# Patient Record
Sex: FEMALE | Race: WHITE | URBAN - METROPOLITAN AREA
[De-identification: names, ages, dates, MRNs, and addresses within clinical notes are randomized per-mention and may not be internally consistent; named-entity substitution may affect disease eponyms.]

---

## 2017-01-17 DIAGNOSIS — I10 ESSENTIAL HYPERTENSION, BENIGN: Primary | ICD-10-CM

## 2017-01-17 NOTE — TELEPHONE ENCOUNTER
Losartan      Last Written Prescription Date: 02/16/16  Last Fill Quantity: 90, # refills: 3  [place] [facility] or [facility] prescribing provider: 02/16/16       POTASSIUM   Date Value Ref Range Status   02/16/2016 4.7 3.4 - 5.3 mmol/L Final     CREATININE   Date Value Ref Range Status   02/16/2016 0.73 0.52 - 1.04 mg/dL Final     BP Readings from Last 3 Encounters:   02/16/16 110/80   02/19/15 110/72   12/19/13 122/82

## 2017-01-21 RX ORDER — LOSARTAN POTASSIUM 100 MG/1
100 TABLET ORAL DAILY
Qty: 90 TABLET | Refills: 3 | OUTPATIENT
Start: 2017-01-21

## 2017-02-13 ENCOUNTER — TELEPHONE (OUTPATIENT)
Dept: INTERNAL MEDICINE | Facility: CLINIC | Age: 62
End: 2017-02-13

## 2017-02-13 DIAGNOSIS — E78.5 HYPERLIPIDEMIA LDL GOAL <130: ICD-10-CM

## 2017-02-13 NOTE — TELEPHONE ENCOUNTER
Pt calls back.   She states that she has not had a fever for about a week. Cough is only symptoms that continues to linger. She is able to sleep at night, but then coughing starts again when she wakes up. Cough is productive. Home care recommendations provided to pt - tea with lemon and honey, cough drops, humidifier in bedroom, fluids and rest. Recommended office visit if symptoms do not improve over the next 2 weeks or if develops any new symptoms.

## 2017-02-13 NOTE — TELEPHONE ENCOUNTER
Reason for Call:  Other     Detailed comments: pt states that she has been sick with vomitting,diarrhea,fever & cough for the past 2 weeks. She is feeling better but cannot get rid of her cough. She would like to know what she can do for it.    Phone Number Patient can be reached at: Home number on file [phone] (home)    Best Time: anytime    Can we leave a detailed message on this number? YES    Call taken on 2/13/2017 at 11:30 AM by Jacqui Guy

## 2017-02-14 RX ORDER — SIMVASTATIN 40 MG
40 TABLET ORAL AT BEDTIME
Qty: 30 TABLET | Refills: 0 | Status: SHIPPED | OUTPATIENT
Start: 2017-02-14 | End: 2017-03-15

## 2017-03-09 DIAGNOSIS — I10 ESSENTIAL HYPERTENSION, BENIGN: ICD-10-CM

## 2017-03-09 NOTE — TELEPHONE ENCOUNTER
Metoprolol      Last Written Prescription Date: 2/16/16  Last Fill Quantity: 180, # refills: 3    Last Office Visit with [facility], [facility] or [facility] prescribing provider:  2/16/16   Future Office Visit:        BP Readings from Last 3 Encounters:   02/16/16 110/80   02/19/15 110/72   12/19/13 122/82

## 2017-03-15 DIAGNOSIS — E78.5 HYPERLIPIDEMIA LDL GOAL <130: ICD-10-CM

## 2017-03-15 NOTE — TELEPHONE ENCOUNTER
Simvastatin  Last Written Prescription Date: 02/14/17  Last Fill Quantity: 30, # refills: 0  Last Office Visit with [facility], [facility] or [facility] prescribing provider: 02/16/16  Next 5 appointments (look out 90 days)     Mar 21, 2017  8:20 AM CDT   [facility]    [address]  [facility] [address]   [phone]                   Lab Results   Component Value Date    CHOL 192 02/16/2016     Lab Results   Component Value Date    HDL 73 02/16/2016     Lab Results   Component Value Date    LDL 97 02/16/2016     Lab Results   Component Value Date    TRIG 112 02/16/2016     Lab Results   Component Value Date    CHOLHDLRATIO 2.6 02/19/2015       Labs showing if normal/abnormal  Lab Results   Component Value Date    CHOL 192 02/16/2016    TRIG 112 02/16/2016    HDL 73 02/16/2016    LDL 97 02/16/2016    VLDL 25 02/19/2015    CHOLHDLRATIO 2.6 02/19/2015

## 2017-03-16 RX ORDER — SIMVASTATIN 40 MG
40 TABLET ORAL AT BEDTIME
Qty: 30 TABLET | Refills: 0 | Status: SHIPPED | OUTPATIENT
Start: 2017-03-16 | End: 2017-03-21

## 2017-03-16 RX ORDER — METOPROLOL SUCCINATE 50 MG/1
50 TABLET, EXTENDED RELEASE ORAL 2 TIMES DAILY
Qty: 180 TABLET | Refills: 0 | Status: SHIPPED | OUTPATIENT
Start: 2017-03-16 | End: 2017-03-21

## 2017-03-21 ENCOUNTER — OFFICE VISIT (OUTPATIENT)
Dept: INTERNAL MEDICINE | Facility: CLINIC | Age: 62
End: 2017-03-21
Payer: COMMERCIAL

## 2017-03-21 VITALS
WEIGHT: 136.4 LBS | DIASTOLIC BLOOD PRESSURE: 78 MMHG | TEMPERATURE: 97.7 F | HEIGHT: 63 IN | OXYGEN SATURATION: 98 % | BODY MASS INDEX: 24.17 KG/M2 | HEART RATE: 91 BPM | SYSTOLIC BLOOD PRESSURE: 138 MMHG

## 2017-03-21 DIAGNOSIS — E78.5 HYPERLIPIDEMIA LDL GOAL <130: ICD-10-CM

## 2017-03-21 DIAGNOSIS — Z12.11 SPECIAL SCREENING FOR MALIGNANT NEOPLASMS, COLON: ICD-10-CM

## 2017-03-21 DIAGNOSIS — I10 ESSENTIAL HYPERTENSION, BENIGN: Primary | ICD-10-CM

## 2017-03-21 DIAGNOSIS — R00.0 TACHYCARDIA: ICD-10-CM

## 2017-03-21 DIAGNOSIS — Z11.59 SCREENING FOR VIRAL DISEASE: ICD-10-CM

## 2017-03-21 DIAGNOSIS — L65.9 HAIR LOSS: ICD-10-CM

## 2017-03-21 DIAGNOSIS — M81.0 OSTEOPOROSIS: ICD-10-CM

## 2017-03-21 LAB
ANION GAP SERPL CALCULATED.3IONS-SCNC: 7 MMOL/L (ref 3–14)
BUN SERPL-MCNC: 16 MG/DL (ref 7–30)
CALCIUM SERPL-MCNC: 9.4 MG/DL (ref 8.5–10.1)
CHLORIDE SERPL-SCNC: 107 MMOL/L (ref 94–109)
CHOLEST SERPL-MCNC: 183 MG/DL
CO2 SERPL-SCNC: 27 MMOL/L (ref 20–32)
CREAT SERPL-MCNC: 0.86 MG/DL (ref 0.52–1.04)
CREAT UR-MCNC: 38 MG/DL
GFR SERPL CREATININE-BSD FRML MDRD: 67 ML/MIN/1.7M2
GLUCOSE SERPL-MCNC: 92 MG/DL (ref 70–99)
HDLC SERPL-MCNC: 65 MG/DL
LDLC SERPL CALC-MCNC: 99 MG/DL
MICROALBUMIN UR-MCNC: 6 MG/L
MICROALBUMIN/CREAT UR: 15.51 MG/G CR (ref 0–25)
NONHDLC SERPL-MCNC: 118 MG/DL
POTASSIUM SERPL-SCNC: 4.3 MMOL/L (ref 3.4–5.3)
SODIUM SERPL-SCNC: 141 MMOL/L (ref 133–144)
TRIGL SERPL-MCNC: 93 MG/DL
TSH SERPL DL<=0.005 MIU/L-ACNC: 1.93 MU/L (ref 0.4–4)

## 2017-03-21 PROCEDURE — 99214 OFFICE O/P EST MOD 30 MIN: CPT | Performed by: INTERNAL MEDICINE

## 2017-03-21 PROCEDURE — 80048 BASIC METABOLIC PNL TOTAL CA: CPT | Performed by: INTERNAL MEDICINE

## 2017-03-21 PROCEDURE — 84443 ASSAY THYROID STIM HORMONE: CPT | Performed by: INTERNAL MEDICINE

## 2017-03-21 PROCEDURE — 86803 HEPATITIS C AB TEST: CPT | Performed by: INTERNAL MEDICINE

## 2017-03-21 PROCEDURE — 82043 UR ALBUMIN QUANTITATIVE: CPT | Performed by: INTERNAL MEDICINE

## 2017-03-21 PROCEDURE — 80061 LIPID PANEL: CPT | Performed by: INTERNAL MEDICINE

## 2017-03-21 PROCEDURE — 36415 COLL VENOUS BLD VENIPUNCTURE: CPT | Performed by: INTERNAL MEDICINE

## 2017-03-21 RX ORDER — METOPROLOL SUCCINATE 50 MG/1
50 TABLET, EXTENDED RELEASE ORAL 2 TIMES DAILY
Qty: 180 TABLET | Refills: 3 | Status: SHIPPED | OUTPATIENT
Start: 2017-03-21 | End: 2018-03-22

## 2017-03-21 RX ORDER — LOSARTAN POTASSIUM 100 MG/1
100 TABLET ORAL DAILY
Qty: 90 TABLET | Refills: 3 | Status: SHIPPED | OUTPATIENT
Start: 2017-03-21 | End: 2018-05-04

## 2017-03-21 RX ORDER — SIMVASTATIN 40 MG
40 TABLET ORAL AT BEDTIME
Qty: 90 TABLET | Refills: 3 | Status: SHIPPED | OUTPATIENT
Start: 2017-03-21 | End: 2018-05-31

## 2017-03-21 NOTE — MR AVS SNAPSHOT
After Visit Summary   3/21/2017    Beata Richey    MRN: [id number]           Patient Information     Date Of Birth          1955        Visit Information        Provider Department      3/21/2017 8:20 AM Varsha Tavarez MD [facility]        Today's Diagnoses     Essential hypertension, benign    -  1    Hyperlipidemia LDL goal <130        Osteoporosis        Special screening for malignant neoplasms, colon        Screening for viral disease        Hair loss           Follow-ups after your visit        Future tests that were ordered for you today     Open Future Orders        Priority Expected Expires Ordered    Fecal colorectal cancer screen FIT Routine 4/11/2017 6/13/2017 3/21/2017            Who to contact     If you have questions or need follow up information about today's clinic visit or your schedule please contact [facility] directly at [phone].  Normal or non-critical lab and imaging results will be communicated to you by MyChart, letter or phone within 4 business days after the clinic has received the results. If you do not hear from us within 7 days, please contact the clinic through MyChart or phone. If you have a critical or abnormal lab result, we will notify you by phone as soon as possible.  Submit refill requests through [address] or call your pharmacy and they will forward the refill request to us. Please allow 3 business days for your refill to be completed.          Additional Information About Your Visit        MyChart Information     [address] gives you secure access to your electronic health record. If you see a primary care provider, you can also send messages to your care team and make appointments. If you have questions, please call your primary care clinic.  If you do not have a primary care provider, please call [phone] and they will assist you.        Care EveryWhere ID     This is your Care EveryWhere ID. This could be used by  "other organizations to access your [facility] medical records  [id number]        Your Vitals Were     Pulse Temperature Height Pulse Oximetry BMI (Body Mass Index)       91 97.7  F (36.5  C) (Oral) 5' 3\" (1.6 m) 98% 24.16 kg/m2        Blood Pressure from Last 3 Encounters:   03/21/17 130/90   02/16/16 110/80   02/19/15 110/72    Weight from Last 3 Encounters:   03/21/17 136 lb 6.4 oz (61.9 kg)   02/16/16 135 lb (61.2 kg)   02/19/15 134 lb (60.8 kg)              We Performed the Following     Albumin Random Urine Quantitative     Basic metabolic panel     Hepatitis C Screen Reflex to HCV RNA Quant and Genotype     Lipid Profile with reflex to direct LDL     TSH with free T4 reflex          Where to get your medicines      These medications were sent to [facility] [address] [place] [address] - [address] AT [place] [address] & [address]  [address], [facility] [address]     Phone:  [phone]     losartan 100 MG tablet    metoprolol 50 MG 24 hr tablet    simvastatin 40 MG tablet          Primary Care Provider Office Phone # Fax #    Varsha Tavarez [phone]       [facility] [address]  [facility] [address]        Thank you!     Thank you for choosing [facility]  for your care. Our goal is always to provide you with excellent care. Hearing back from our patients is one way we can continue to improve our services. Please take a few minutes to complete the written survey that you may receive in the mail after your visit with us. Thank you!             Your Updated Medication List - Protect others around you: Learn how to safely use, store and throw away your medicines at www.disposemymeds.org.          This list is accurate as of: 3/21/17  8:54 AM.  Always use your most recent med list.                   Brand Name Dispense Instructions for use    aspirin 81 MG tablet     100    ONE DAILY       CALCIUM 600 + D PO      Take 1 tablet by " mouth 2 times daily.       losartan 100 MG tablet    COZAAR    90 tablet    Take 1 tablet (100 mg) by mouth daily       metoprolol 50 MG 24 hr tablet    TOPROL XL    180 tablet    Take 1 tablet (50 mg) by mouth 2 times daily       multivitamin per tablet      1 tablet daily       PROBIOTIC DAILY PO      Take 1 tablet by mouth daily       simvastatin 40 MG tablet    ZOCOR    90 tablet    Take 1 tablet (40 mg) by mouth At Bedtime at bedtime.       VITAMIN D-1000 MAX ST PO      Take 1 tablet by mouth 2 times daily.       ZYRTEC ALLERGY 10 MG Caps   Generic drug:  cetirizine HCl      Take 1 capsule by mouth daily

## 2017-03-21 NOTE — PROGRESS NOTES
SUBJECTIVE:                                                    Beata Richey is a 62 year old female who presents to clinic today for the following health issues:      Hyperlipidemia Follow-Up      Rate your low fat/cholesterol diet?: good    Taking statin?  Yes, no muscle aches from statin    Other lipid medications/supplements?:  none     Hypertension Follow-up      Outpatient blood pressures rarely    Low Salt Diet: low salt       Amount of exercise or physical activity: 2-3 days/week for an average of 15-30 minutes    Problems taking medications regularly: No    Medication side effects: none    Diet: regular (no restrictions)         Other problems:   1. Tachycardia: no symptoms recently  2. Osteoporosis: did not have DXA few years ago  3. GERD: rare symptoms.     Current Concerns:   Wonders about thyroid, she has significant family history, some hair loss. No other symptoms.       Patient Active Problem List   Diagnosis     Essential hypertension, benign     Osteoporosis     Allergic rhinitis due to other allergen     Tachycardia     Esophageal reflux     Hyperlipidemia LDL goal <130     Advanced directives, counseling/discussion       Current Outpatient Prescriptions   Medication Sig Dispense Refill     metoprolol (TOPROL XL) 50 MG 24 hr tablet Take 1 tablet (50 mg) by mouth 2 times daily 180 tablet 0     simvastatin (ZOCOR) 40 MG tablet Take 1 tablet (40 mg) by mouth At Bedtime at bedtime. 30 tablet 0     losartan (COZAAR) 100 MG tablet Take 1 tablet (100 mg) by mouth daily 90 tablet 3     cetirizine HCl (ZYRTEC ALLERGY) 10 MG CAPS Take 1 capsule by mouth daily       Probiotic Product (PROBIOTIC DAILY PO) Take 1 tablet by mouth daily       Cholecalciferol (VITAMIN D-1000 MAX ST PO) Take 1 tablet by mouth 2 times daily.       Calcium Carbonate-Vitamin D (CALCIUM 600 + D OR) Take 1 tablet by mouth 2 times daily.       ASPIRIN 81 MG OR TABS ONE DAILY 100 3     MULTIVITAMINS OR TABS 1 tablet daily  0  "        Social History   Substance Use Topics     Smoking status: Never Smoker     Smokeless tobacco: Never Used     Alcohol use Yes      Comment: about 3 times a week a glass of wine        ROS:  No fever, chills, no dyspnea on exertion, no chest pain, palpitations, PND, orthopnea, edema, syncope, headache, abdominal pain, no constipation, always cold but not changed, no weight changes    OBJECTIVE:                                                    /90  Pulse 91  Temp 97.7  F (36.5  C) (Oral)  Ht 5' 3\" (1.6 m)  Wt 136 lb 6.4 oz (61.9 kg)  SpO2 98%  BMI 24.16 kg/m2  Body mass index is 24.16 kg/(m^2).    No thyromegaly  Lungs: clear  CV: normal S1, S2 without murmur, S3 or S4.   Abdomen: Bowel sounds normal, soft, nontender. No hepatosplenomegaly. No masses.   No edema  Pulses full, no carotid bruits         ASSESSMENT/PLAN:                                                            1. Essential hypertension, benign  controlled  - metoprolol (TOPROL XL) 50 MG 24 hr tablet; Take 1 tablet (50 mg) by mouth 2 times daily  Dispense: 180 tablet; Refill: 3  - losartan (COZAAR) 100 MG tablet; Take 1 tablet (100 mg) by mouth daily  Dispense: 90 tablet; Refill: 3  - Basic metabolic panel  - Albumin Random Urine Quantitative    2. Hyperlipidemia LDL goal <130  Recheck lab  - simvastatin (ZOCOR) 40 MG tablet; Take 1 tablet (40 mg) by mouth At Bedtime at bedtime.  Dispense: 90 tablet; Refill: 3  - Lipid Profile with reflex to direct LDL    3. Osteoporosis  Due for DXA  - DX Hip/Pelvis/Spine; Future    4. Tachycardia  Stable on metoprolol    5. Hair loss  May be age related but with family history check thryoid  - TSH with free T4 reflex    6. Special screening for malignant neoplasms, colon  due  - Fecal colorectal cancer screen FIT; Future    7. Screening for viral disease  due  - Hepatitis C Screen Reflex to HCV RNA Quant and Genotype        Varsha Tavarez MD  [facility]    "

## 2017-03-21 NOTE — NURSING NOTE
"Chief Complaint   Patient presents with     Recheck Medication       Initial /90  Pulse 91  Temp 97.7  F (36.5  C) (Oral)  Ht 5' 3\" (1.6 m)  Wt 136 lb 6.4 oz (61.9 kg)  SpO2 98%  BMI 24.16 kg/m2 Estimated body mass index is 24.16 kg/(m^2) as calculated from the following:    Height as of this encounter: 5' 3\" (1.6 m).    Weight as of this encounter: 136 lb 6.4 oz (61.9 kg).  Medication Reconciliation: complete    "

## 2017-03-22 LAB — HCV AB SERPL QL IA: NORMAL

## 2017-03-23 ENCOUNTER — TELEPHONE (OUTPATIENT)
Dept: BONE DENSITY | Facility: CLINIC | Age: 62
End: 2017-03-23

## 2017-03-24 ENCOUNTER — TELEPHONE (OUTPATIENT)
Dept: OTHER | Facility: CLINIC | Age: 62
End: 2017-03-24

## 2017-03-24 NOTE — TELEPHONE ENCOUNTER
3/24/2017     Call Regarding Onboarding [employer]  Attempt 1     Message on voicemail   Comments: 0 dep        Outreach   CHIVO

## 2017-05-26 NOTE — TELEPHONE ENCOUNTER
5/26/2017    Call Regarding Onboarding [employer]    Attempt 2    Message on voicemail     Comments: NO DEP        Outreach   DIPAK

## 2017-07-17 ENCOUNTER — RADIANT APPOINTMENT (OUTPATIENT)
Dept: BONE DENSITY | Facility: CLINIC | Age: 62
End: 2017-07-17
Attending: INTERNAL MEDICINE
Payer: COMMERCIAL

## 2017-07-17 DIAGNOSIS — M81.0 OSTEOPOROSIS: ICD-10-CM

## 2017-07-17 PROCEDURE — 77080 DXA BONE DENSITY AXIAL: CPT | Performed by: INTERNAL MEDICINE

## 2017-08-07 ENCOUNTER — TELEPHONE (OUTPATIENT)
Dept: INTERNAL MEDICINE | Facility: CLINIC | Age: 62
End: 2017-08-07

## 2017-08-07 NOTE — TELEPHONE ENCOUNTER
Panel Management Review      Patient has the following on her problem list: None      Composite cancer screening  Chart review shows that this patient is due/due soon for the following Mammogram and Fecal Colorectal (FIT)  Summary:    Patient is due/failing the following:   FIT and MAMMOGRAM    Action needed:   Patient needs referral/order: FIT and Mammogram    Type of outreach:    Phone, left message for patient to call back.     Questions for provider review:    None                                                                                                                                     Debby Poe [facility]       Chart routed to Care Team .

## 2017-10-14 ENCOUNTER — HEALTH MAINTENANCE LETTER (OUTPATIENT)
Age: 62
End: 2017-10-14

## 2017-11-10 ENCOUNTER — TELEPHONE (OUTPATIENT)
Dept: INTERNAL MEDICINE | Facility: CLINIC | Age: 62
End: 2017-11-10

## 2017-11-10 NOTE — TELEPHONE ENCOUNTER
11/10/2017    Call Regarding Preventive Health Screening Mammogram    Attempt 2    Message on voicemail     Comments:       Outreach   MG

## 2018-01-04 ENCOUNTER — TELEPHONE (OUTPATIENT)
Dept: INTERNAL MEDICINE | Facility: CLINIC | Age: 63
End: 2018-01-04

## 2018-01-04 NOTE — TELEPHONE ENCOUNTER
"Beata Richey is a 62 year old female who calls with Heart Palpitations .    NURSING ASSESSMENT:  Description:  Felt like it was pounding 1 minute. Can feel it pounding. Then felt like a heaviness. Lasted about 20 minutes total. This is more \"heavy\" not racing, like she had before.  Onset/duration:  1-2 a day, for one week. Not every day.   Precip. factors:  In the AM. Just sitting there.   Associated symptoms:  Has a younger brother that had 2 Strokes and MI right after [place]. He is 61 yo.   Improves/worsens symptoms:  Takes Metoprolol.   Pain scale (0-10)   0/10    Last exam/Treatment:  3/21/17   Allergies:   Allergies   Allergen Reactions     Sulfa Drugs Rash       MEDICATIONS:   Taking medication(s) as prescribed? Yes Her Metoprolol was increased last year due to her Tachycardia   Taking over the counter medication(s?) No  Any medication side effects? Not Applicable    Any barriers to taking medication(s) as prescribed?  No  Medication(s) improving/managing symptoms?  N/A  Medication reconciliation completed: Yes      NURSING PLAN: Nursing advice to patient Scheduled OV for tomorrow. advised ED if sx worsen.     RECOMMENDED DISPOSITION:  See in 24 hours -   Will comply with recommendation: Yes  If further questions/concerns or if symptoms do not improve, worsen or new symptoms develop, call your PCP or [facility] as soon as possible.      Guideline used:  Telephone Triage Protocols for Nurses, Fourth Edition, Ana Gardner RN  "

## 2018-01-05 ENCOUNTER — OFFICE VISIT (OUTPATIENT)
Dept: INTERNAL MEDICINE | Facility: CLINIC | Age: 63
End: 2018-01-05
Payer: COMMERCIAL

## 2018-01-05 VITALS
RESPIRATION RATE: 16 BRPM | OXYGEN SATURATION: 96 % | BODY MASS INDEX: 23.49 KG/M2 | DIASTOLIC BLOOD PRESSURE: 82 MMHG | TEMPERATURE: 98 F | WEIGHT: 132.6 LBS | SYSTOLIC BLOOD PRESSURE: 142 MMHG | HEART RATE: 68 BPM

## 2018-01-05 DIAGNOSIS — R00.2 PALPITATIONS: Primary | ICD-10-CM

## 2018-01-05 PROCEDURE — 36415 COLL VENOUS BLD VENIPUNCTURE: CPT | Performed by: INTERNAL MEDICINE

## 2018-01-05 PROCEDURE — 83735 ASSAY OF MAGNESIUM: CPT | Performed by: INTERNAL MEDICINE

## 2018-01-05 PROCEDURE — 84443 ASSAY THYROID STIM HORMONE: CPT | Performed by: INTERNAL MEDICINE

## 2018-01-05 PROCEDURE — 99214 OFFICE O/P EST MOD 30 MIN: CPT | Performed by: INTERNAL MEDICINE

## 2018-01-05 PROCEDURE — 84132 ASSAY OF SERUM POTASSIUM: CPT | Performed by: INTERNAL MEDICINE

## 2018-01-05 RX ORDER — CHOLECALCIFEROL (VITAMIN D3) 25 MCG
1000 CAPSULE ORAL 2 TIMES DAILY
COMMUNITY
End: 2018-05-31

## 2018-01-05 NOTE — PROGRESS NOTES
SUBJECTIVE:   Beata Richey is a 62 year old female who presents to clinic today for the following health issues:      Palpitations: she had an episode yesterday morning when she felt her heart was beating very hard. It was not very fast. She had just had coffee but was sitting and relaxed. It lasted about a minute,  stopped abruptly. She felt some heaviness n her chest for 20 minutes after.  She has not had this before. She had some sharp pains in her left upper lateral chest on and off during the evening yesterday, mild pain today. She felt a little lightheaded with this, no dyspnea, sweating, nausea.     She has had other episodes of fast heartbeats the past 1-2 weeks. This is about every other day for a few minutes, can recur during the day. It also stops abruptly, seems irregular. No associated symptoms. These are a little different from previous tachycardia.     No decongestants. Caffeine a few per day. She has had some stress with family sharing a house over the holidays and she had to do much of the work and a younger brother had MI, stroke.       Patient Active Problem List   Diagnosis     Essential hypertension, benign     Osteoporosis     Allergic rhinitis due to other allergen     Tachycardia     Esophageal reflux     Hyperlipidemia LDL goal <130     Advanced directives, counseling/discussion     Current Outpatient Prescriptions   Medication Sig Dispense Refill     Cholecalciferol (VITAMIN D-3) 1000 UNITS CAPS Take 1,000 Units by mouth 2 times daily       metoprolol (TOPROL XL) 50 MG 24 hr tablet Take 1 tablet (50 mg) by mouth 2 times daily 180 tablet 3     simvastatin (ZOCOR) 40 MG tablet Take 1 tablet (40 mg) by mouth At Bedtime at bedtime. 90 tablet 3     losartan (COZAAR) 100 MG tablet Take 1 tablet (100 mg) by mouth daily 90 tablet 3     cetirizine HCl (ZYRTEC ALLERGY) 10 MG CAPS Take 1 capsule by mouth daily       Probiotic Product (PROBIOTIC DAILY PO) Take 1 tablet by mouth daily        Cholecalciferol (VITAMIN D-1000 MAX ST PO) Take 1 tablet by mouth 2 times daily.       Calcium Carbonate-Vitamin D (CALCIUM 600 + D OR) Take 1 tablet by mouth 2 times daily.       ASPIRIN 81 MG OR TABS ONE DAILY 100 3     MULTIVITAMINS OR TABS 1 tablet daily  0      Social History   Substance Use Topics     Smoking status: Never Smoker     Smokeless tobacco: Never Used     Alcohol use Yes      Comment: about 3 times a week a glass of wine        Reviewed and updated as needed this visit by clinical staff  Tobacco  Allergies  Meds  Med Hx  Surg Hx  Fam Hx  Soc Hx      Reviewed and updated as needed this visit by Provider         ROS:  No fever, chills, syncope, ongoing lightheadedness, nausea, dyspnea on exertion or rest, no other exertional chest pains,    OBJECTIVE:     /82  Pulse 68  Temp 98  F (36.7  C) (Oral)  Resp 16  Wt 132 lb 9.6 oz (60.1 kg)  SpO2 96%  BMI 23.49 kg/m2  Body mass index is 23.49 kg/(m^2).    CV: normal S1, S2 without murmur, S3 or S4.   Lungs: clear  Some tenderness left upper pectoralis muscle       ASSESSMENT/PLAN:       1. Palpitations  Advised this may be prematures, could have had bigeminy yesterday. Previous event recorders with benign prematures, but since feel different, need to assess for other cause like afib. Check labs to be sure not metabolic. Reassured chest pain is probably muscular. Advise for persistent, prolonged symptoms, any associated significant pain, syncope call 911.   - Potassium  - Magnesium  - TSH with free T4 reflex  - Cardiac Event Monitor - Peds/Adult; Future        Varsha Tavarez MD  [facility]

## 2018-01-05 NOTE — MR AVS SNAPSHOT
After Visit Summary   1/5/2018    Beata Richey    MRN: [id number]           Patient Information     Date Of Birth          1955        Visit Information        Provider Department      1/5/2018 10:40 AM Varsha Tavarez MD [facility]        Today's Diagnoses     Palpitations    -  1       Follow-ups after your visit        Who to contact     If you have questions or need follow up information about today's clinic visit or your schedule please contact [facility] directly at [phone].  Normal or non-critical lab and imaging results will be communicated to you by MyChart, letter or phone within 4 business days after the clinic has received the results. If you do not hear from us within 7 days, please contact the clinic through [employer] or phone. If you have a critical or abnormal lab result, we will notify you by phone as soon as possible.  Submit refill requests through [employer] or call your pharmacy and they will forward the refill request to us. Please allow 3 business days for your refill to be completed.          Additional Information About Your Visit        MyChart Information     F[employer] gives you secure access to your electronic health record. If you see a primary care provider, you can also send messages to your care team and make appointments. If you have questions, please call your primary care clinic.  If you do not have a primary care provider, please call [phone] and they will assist you.        Care EveryWhere ID     This is your Care EveryWhere ID. This could be used by other organizations to access your [place] medical records  [id number]        Your Vitals Were     Pulse Temperature Respirations Pulse Oximetry BMI (Body Mass Index)       68 98  F (36.7  C) (Oral) 16 96% 23.49 kg/m2        Blood Pressure from Last 3 Encounters:   01/05/18 142/82   03/21/17 138/78   02/16/16 110/80    Weight from Last 3 Encounters:   01/05/18 132 lb 9.6 oz  (60.1 kg)   03/21/17 136 lb 6.4 oz (61.9 kg)   02/16/16 135 lb (61.2 kg)              We Performed the Following     Magnesium     Potassium     TSH with free T4 reflex        Primary Care Provider Office Phone # Fax #    Varsha Tavarez [phone]       [address]  [facility] [address]        Equal Access to Services     [facility]: Hadii aad ku hadasho Soomaali, waaxda luqadaha, qaybta kaalmada adeegyada, waxay idiin hayaan adeeg ruthsh la'aan . So [facility] [phone].    ATENCIÓN: Si habla español, tiene a reyes disposición servicios gratuitos de asistencia lingüística. Llame al [phone].    We comply with applicable federal civil rights laws and [place] laws. We do not discriminate on the basis of race, color, national origin, age, disability, sex, sexual orientation, or gender identity.            Thank you!     Thank you for choosing [facility]  for your care. Our goal is always to provide you with excellent care. Hearing back from our patients is one way we can continue to improve our services. Please take a few minutes to complete the written survey that you may receive in the mail after your visit with us. Thank you!             Your Updated Medication List - Protect others around you: Learn how to safely use, store and throw away your medicines at www.disposemymeds.org.          This list is accurate as of: 1/5/18 11:59 PM.  Always use your most recent med list.                   Brand Name Dispense Instructions for use Diagnosis    aspirin 81 MG tablet     100    ONE DAILY    Other and unspecified hyperlipidemia       CALCIUM 600 + D PO      Take 1 tablet by mouth 2 times daily.    History of mammography, screening, Osteoporosis, unspecified, Screening for malignant neoplasm of the rectum, Routine general medical examination at a health care facility, Need for prophylactic vaccination and inoculation against influenza, Essential hypertension, benign,  Esophageal reflux, Hyperlipidemia LDL goal <130, Allergic rhinitis due to other allergen       losartan 100 MG tablet    COZAAR    90 tablet    Take 1 tablet (100 mg) by mouth daily    Essential hypertension, benign       metoprolol 50 MG 24 hr tablet    TOPROL XL    180 tablet    Take 1 tablet (50 mg) by mouth 2 times daily    Essential hypertension, benign       multivitamin per tablet      1 tablet daily    Osteoporosis, unspecified       PROBIOTIC DAILY PO      Take 1 tablet by mouth daily    H/O screening mammography, Routine general medical examination at a health care facility, Need for prophylactic vaccination and inoculation against influenza, Screening for malignant neoplasm of the rectum, Multiple nevi       simvastatin 40 MG tablet    ZOCOR    90 tablet    Take 1 tablet (40 mg) by mouth At Bedtime at bedtime.    Hyperlipidemia LDL goal <130       * Vitamin D-3 1000 UNITS Caps      Take 1,000 Units by mouth 2 times daily        * VITAMIN D-1000 MAX ST PO      Take 1 tablet by mouth 2 times daily.    History of mammography, screening, Osteoporosis, unspecified, Screening for malignant neoplasm of the rectum, Routine general medical examination at a health care facility, Need for prophylactic vaccination and inoculation against influenza, Essential hypertension, benign, Esophageal reflux, Hyperlipidemia LDL goal <130, Allergic rhinitis due to other allergen       ZYRTEC ALLERGY 10 MG Caps   Generic drug:  cetirizine HCl      Take 1 capsule by mouth daily    H/O screening mammography, Routine general medical examination at a health care facility, Need for prophylactic vaccination and inoculation against influenza, Screening for malignant neoplasm of the rectum, Multiple nevi       * Notice:  This list has 2 medication(s) that are the same as other medications prescribed for you. Read the directions carefully, and ask your doctor or other care provider to review them with you.

## 2018-01-05 NOTE — NURSING NOTE
"Chief Complaint   Patient presents with     Palpitations     See traige note 01/04/2018: chest pain and irregular heart beats.        Initial /82  Pulse 68  Temp 98  F (36.7  C) (Oral)  Resp 16  Wt 132 lb 9.6 oz (60.1 kg)  SpO2 96%  BMI 23.49 kg/m2 Estimated body mass index is 23.49 kg/(m^2) as calculated from the following:    Height as of 3/21/17: 5' 3\" (1.6 m).    Weight as of this encounter: 132 lb 9.6 oz (60.1 kg).  Medication Reconciliation: complete      Debby Poe CMA    Discussed Health Maintenance:    Patient agreed to schedule Mammogram once goes on Medicare and will complete FIT test. Order have been place and information given to patient.       "

## 2018-01-06 LAB
MAGNESIUM SERPL-MCNC: 2.2 MG/DL (ref 1.6–2.3)
POTASSIUM SERPL-SCNC: 4.6 MMOL/L (ref 3.4–5.3)
TSH SERPL DL<=0.005 MIU/L-ACNC: 1.39 MU/L (ref 0.4–4)

## 2018-01-08 ASSESSMENT — PATIENT HEALTH QUESTIONNAIRE - PHQ9
SUM OF ALL RESPONSES TO PHQ QUESTIONS 1-9: 5
5. POOR APPETITE OR OVEREATING: NOT AT ALL

## 2018-01-08 ASSESSMENT — ANXIETY QUESTIONNAIRES
3. WORRYING TOO MUCH ABOUT DIFFERENT THINGS: SEVERAL DAYS
5. BEING SO RESTLESS THAT IT IS HARD TO SIT STILL: SEVERAL DAYS
6. BECOMING EASILY ANNOYED OR IRRITABLE: MORE THAN HALF THE DAYS
1. FEELING NERVOUS, ANXIOUS, OR ON EDGE: SEVERAL DAYS
7. FEELING AFRAID AS IF SOMETHING AWFUL MIGHT HAPPEN: NOT AT ALL
GAD7 TOTAL SCORE: 6
2. NOT BEING ABLE TO STOP OR CONTROL WORRYING: SEVERAL DAYS

## 2018-01-09 ASSESSMENT — ANXIETY QUESTIONNAIRES: GAD7 TOTAL SCORE: 6

## 2018-01-12 ENCOUNTER — HOSPITAL ENCOUNTER (OUTPATIENT)
Dept: CARDIOLOGY | Facility: CLINIC | Age: 63
Discharge: HOME | End: 2018-01-12
Attending: INTERNAL MEDICINE | Admitting: INTERNAL MEDICINE
Payer: COMMERCIAL

## 2018-01-12 DIAGNOSIS — R00.2 PALPITATIONS: ICD-10-CM

## 2018-01-12 PROCEDURE — 93272 ECG/REVIEW INTERPRET ONLY: CPT | Performed by: INTERNAL MEDICINE

## 2018-01-12 PROCEDURE — 93270 REMOTE 30 DAY ECG REV/REPORT: CPT

## 2018-01-15 DIAGNOSIS — E78.5 HYPERLIPIDEMIA LDL GOAL <130: ICD-10-CM

## 2018-01-18 RX ORDER — SIMVASTATIN 40 MG
TABLET ORAL
Qty: 30 TABLET | Refills: 2 | Status: SHIPPED | OUTPATIENT
Start: 2018-01-18 | End: 2018-04-12

## 2018-01-18 NOTE — TELEPHONE ENCOUNTER
"Requested Prescriptions   Pending Prescriptions Disp Refills     simvastatin (ZOCOR) 40 MG tablet [Pharmacy Med Name: SIMVASTATIN 40MG TABLETS] 30 tablet 0     Sig: TAKE 1 TABLET(40 MG) BY MOUTH AT BEDTIME    Statins Protocol Passed    1/15/2018 10:05 AM       Passed - LDL on file in past 12 months    Recent Labs   Lab Test  03/21/17   0853   LDL  99            Passed - No abnormal creatine kinase in past 12 months    No lab results found.         Passed - Recent or future visit with authorizing provider    Patient had office visit in the last year or has a visit in the next 30 days with authorizing provider.  See \"Patient Info\" tab in inbasket, or \"Choose Columns\" in Meds & Orders section of the refill encounter.            Passed - Patient is age 18 or older       Passed - No active pregnancy on record       Passed - No positive pregnancy test in past 12 months        Last office visit 1/5/18    Prescription approved per [facility] Refill Protocol.  Tiera Dove RN      "

## 2018-01-29 ENCOUNTER — TELEPHONE (OUTPATIENT)
Dept: INTERNAL MEDICINE | Facility: CLINIC | Age: 63
End: 2018-01-29

## 2018-01-29 NOTE — TELEPHONE ENCOUNTER
Panel Management Review      Patient has the following on her problem list:     Hypertension   Last three blood pressure readings:  BP Readings from Last 3 Encounters:   01/05/18 142/82   03/21/17 138/78   02/16/16 110/80     Blood pressure: FAILED    HTN Guidelines:  Age 18-59 BP range:  Less than 140/90  Age 60-85 with Diabetes:  Less than 140/90  Age 60-85 without Diabetes:  less than 150/90      Composite cancer screening  Chart review shows that this patient is due/due soon for the following Mammogram, Colonoscopy and Fecal Colorectal (FIT)  Summary:    Patient is due/failing the following:   FIT and MAMMOGRAM    Action needed:   No action require    Type of outreach:    Spoke to patient during LOV 01/2018- discuss HM. Pt agreed to schedule mammogram and  FIT kit.     Questions for provider review:    None                                                                                                                                     Debby Poe CMA       Chart routed to CLOSED .

## 2018-03-22 DIAGNOSIS — I10 ESSENTIAL HYPERTENSION, BENIGN: ICD-10-CM

## 2018-03-23 NOTE — TELEPHONE ENCOUNTER
"Requested Prescriptions   Pending Prescriptions Disp Refills     metoprolol succinate (TOPROL-XL) 50 MG 24 hr tablet [Pharmacy Med Name: METOPROLOL ER SUCCINATE 50MG TABS] 180 tablet 0     Sig: TAKE 1 TABLET BY MOUTH TWICE DAILY    Beta-Blockers Protocol Failed    3/22/2018  9:20 AM       Failed - Blood pressure under 140/90 in past 12 months    BP Readings from Last 3 Encounters:   01/05/18 142/82   03/21/17 138/78   02/16/16 110/80                Passed - Patient is age 6 or older       Passed - Recent (12 mo) or future (30 days) visit within the authorizing provider's specialty    Patient had office visit in the last 12 months or has a visit in the next 30 days with authorizing provider or within the authorizing provider's specialty.  See \"Patient Info\" tab in inbasket, or \"Choose Columns\" in Meds & Orders section of the refill encounter.            Routing refill request to provider for review/approval because:  Labs out of range:  BP        "

## 2018-03-24 RX ORDER — METOPROLOL SUCCINATE 50 MG/1
TABLET, EXTENDED RELEASE ORAL
Qty: 180 TABLET | Refills: 0 | Status: SHIPPED | OUTPATIENT
Start: 2018-03-24 | End: 2018-06-21

## 2018-04-12 DIAGNOSIS — E78.5 HYPERLIPIDEMIA LDL GOAL <130: ICD-10-CM

## 2018-04-13 ENCOUNTER — MYC MEDICAL ADVICE (OUTPATIENT)
Dept: INTERNAL MEDICINE | Facility: CLINIC | Age: 63
End: 2018-04-13

## 2018-04-13 RX ORDER — SIMVASTATIN 40 MG
TABLET ORAL
Qty: 90 TABLET | Refills: 0 | Status: SHIPPED | OUTPATIENT
Start: 2018-04-13 | End: 2018-07-27

## 2018-05-04 DIAGNOSIS — I10 ESSENTIAL HYPERTENSION, BENIGN: ICD-10-CM

## 2018-05-07 RX ORDER — LOSARTAN POTASSIUM 100 MG/1
TABLET ORAL
Qty: 90 TABLET | Refills: 0 | Status: SHIPPED | OUTPATIENT
Start: 2018-05-07 | End: 2018-07-31

## 2018-05-07 NOTE — TELEPHONE ENCOUNTER
"Pt had appt today at 8am, but no showed     Requested Prescriptions   Pending Prescriptions Disp Refills     losartan (COZAAR) 100 MG tablet [Pharmacy Med Name: LOSARTAN 100MG TABLETS] 90 tablet 0     Sig: TAKE 1 TABLET(100 MG) BY MOUTH DAILY    Angiotensin-II Receptors Failed    5/7/2018 11:54 AM       Failed - Blood pressure under 140/90 in past 12 months    BP Readings from Last 3 Encounters:   01/05/18 142/82   03/21/17 138/78   02/16/16 110/80                Failed - Normal serum creatinine on file in past 12 months    Recent Labs   Lab Test  03/21/17   0853   CR  0.86            Passed - Recent (12 mo) or future (30 days) visit within the authorizing provider's specialty    Patient had office visit in the last 12 months or has a visit in the next 30 days with authorizing provider or within the authorizing provider's specialty.  See \"Patient Info\" tab in inbasket, or \"Choose Columns\" in Meds & Orders section of the refill encounter.           Passed - Patient is age 18 or older       Passed - No active pregnancy on record       Passed - Normal serum potassium on file in past 12 months    Recent Labs   Lab Test  01/05/18   1150   POTASSIUM  4.6                   Passed - No positive pregnancy test in past 12 months        '  "

## 2018-05-07 NOTE — TELEPHONE ENCOUNTER
Call and reschedule appointment because she apparently never saw the [employer] message scheduling the appointment.

## 2018-05-31 ENCOUNTER — OFFICE VISIT (OUTPATIENT)
Dept: INTERNAL MEDICINE | Facility: CLINIC | Age: 63
End: 2018-05-31
Payer: COMMERCIAL

## 2018-05-31 ENCOUNTER — HOSPITAL ENCOUNTER (OUTPATIENT)
Facility: CLINIC | Age: 63
Setting detail: SPECIMEN
Discharge: HOME | End: 2018-05-31
Admitting: INTERNAL MEDICINE
Payer: COMMERCIAL

## 2018-05-31 VITALS
TEMPERATURE: 97.9 F | WEIGHT: 137.1 LBS | DIASTOLIC BLOOD PRESSURE: 88 MMHG | HEIGHT: 63 IN | RESPIRATION RATE: 16 BRPM | OXYGEN SATURATION: 96 % | HEART RATE: 80 BPM | SYSTOLIC BLOOD PRESSURE: 139 MMHG | BODY MASS INDEX: 24.29 KG/M2

## 2018-05-31 DIAGNOSIS — R00.0 TACHYCARDIA: ICD-10-CM

## 2018-05-31 DIAGNOSIS — M81.0 OSTEOPOROSIS WITHOUT CURRENT PATHOLOGICAL FRACTURE, UNSPECIFIED OSTEOPOROSIS TYPE: ICD-10-CM

## 2018-05-31 DIAGNOSIS — E78.5 HYPERLIPIDEMIA LDL GOAL <130: ICD-10-CM

## 2018-05-31 DIAGNOSIS — R60.9 EDEMA, UNSPECIFIED TYPE: ICD-10-CM

## 2018-05-31 DIAGNOSIS — I10 ESSENTIAL HYPERTENSION, BENIGN: Primary | ICD-10-CM

## 2018-05-31 DIAGNOSIS — Z12.11 SPECIAL SCREENING FOR MALIGNANT NEOPLASMS, COLON: ICD-10-CM

## 2018-05-31 PROCEDURE — 82274 ASSAY TEST FOR BLOOD FECAL: CPT | Performed by: INTERNAL MEDICINE

## 2018-05-31 PROCEDURE — 99214 OFFICE O/P EST MOD 30 MIN: CPT | Performed by: INTERNAL MEDICINE

## 2018-05-31 NOTE — PROGRESS NOTES
SUBJECTIVE:   Beata Richey is a 63 year old female who presents to clinic today for the following health issues:      Hyperlipidemia Follow-Up      Rate your low fat/cholesterol diet?: fair    Taking statin?  Yes, no muscle aches from statin    Other lipid medications/supplements?:  none    Hypertension Follow-up      Outpatient blood pressures are being checked at home.  Results are 115/65.    Low Salt Diet: not monitoring salt      Amount of exercise or physical activity: play pickle ball, plank, garden.    Problems taking medications regularly: No    Medication side effects: none    Diet: regular (no restrictions)      Other problems:   1. Osteoporosis: She reports she received my my chart notes about density last year and reports that she had heartburn/GI upset from Fosamax and Boniva.  She would be willing to consider Reclast.  2.  Tachycardia: She has had no further episodes of tachycardia.  Her event recorder showed no concerning rhythm changes.      Current Concerns:   Complaints of edema right ankle for about 3 weeks.  This started after returning here from [place] and about the time it started to get fairly warm.  It seems there all the time but a little worse later in the day.  There is no associated pain with it.  No pain in the calf.  No acute disc.    Patient Active Problem List   Diagnosis     Essential hypertension, benign     Osteoporosis     Allergic rhinitis     Tachycardia     Esophageal reflux     Hyperlipidemia LDL goal <130     Advanced directives, counseling/discussion       Current Outpatient Prescriptions   Medication Sig Dispense Refill     ASPIRIN 81 MG OR TABS ONE DAILY 100 3     Calcium Carbonate-Vitamin D (CALCIUM 600 + D OR) Take 1 tablet by mouth 2 times daily.       cetirizine HCl (ZYRTEC ALLERGY) 10 MG CAPS Take 1 capsule by mouth daily       Cholecalciferol (VITAMIN D-1000 MAX ST PO) Take 1 tablet by mouth 2 times daily.       losartan (COZAAR) 100 MG tablet TAKE 1 TABLET(100  "MG) BY MOUTH DAILY 90 tablet 0     metoprolol succinate (TOPROL-XL) 50 MG 24 hr tablet TAKE 1 TABLET BY MOUTH TWICE DAILY (Patient taking differently: pt takes 2 pills for a total of 100 mg in am) 180 tablet 0     MULTIVITAMINS OR TABS 1 tablet daily  0     Probiotic Product (PROBIOTIC DAILY PO) Take 1 tablet by mouth daily       simvastatin (ZOCOR) 40 MG tablet TAKE 1 TABLET(40 MG) BY MOUTH AT BEDTIME 90 tablet 0     [DISCONTINUED] simvastatin (ZOCOR) 40 MG tablet Take 1 tablet (40 mg) by mouth At Bedtime at bedtime. 90 tablet 3         Social History   Substance Use Topics     Smoking status: Never Smoker     Smokeless tobacco: Never Used     Alcohol use Yes      Comment: about 3 times a week a glass of wine        ROS:  No fever, chills, no dyspnea on exertion, no chest pain, palpitations, PND, orthopnea, edema, syncope, headache, abdominal pain     OBJECTIVE:     /88  Pulse 80  Temp 97.9  F (36.6  C) (Oral)  Resp 16  Ht 5' 2.85\" (1.596 m)  Wt 137 lb 1.6 oz (62.2 kg)  SpO2 96%  BMI 24.4 kg/m2  Body mass index is 24.4 kg/(m^2).      Lungs: clear  CV: normal S1, S2 without murmur, S3 or S4.   Abdomen: Bowel sounds normal, soft, nontender. No hepatosplenomegaly. No masses.   There is a fairly large fat pad at the right lateral ankle, smaller one on the left.  There is trace edema.  No effusion in the ankle joint.  Non-tender.  There are some veins present both lower extremities.  Pulses full, no carotid bruits      ASSESSMENT/PLAN:             1. Essential hypertension, benign  Controlled, continue medication, check labs  - Basic metabolic panel; Future  - Albumin Random Urine Quantitative with Creat Ratio; Future    2. Osteoporosis without current pathological fracture, unspecified osteoporosis type  Recommend Reclast, will wait for labs to return and then do the request as she will need calcium levels and creatinine.    3. Edema, unspecified type  Very minor, likely related to some vein insufficiency, " consider support stockings if more uncomfortable    4. Hyperlipidemia LDL goal <130  Recheck labs  - Lipid panel reflex to direct LDL Fasting; Future    5. Tachycardia  No symptoms currently    6. Special screening for malignant neoplasms, colon  Due  - Fecal colorectal cancer screen (FIT); Future        Varsha Tavarez MD  [facility]

## 2018-05-31 NOTE — MR AVS SNAPSHOT
After Visit Summary   5/31/2018    Beata Richey    MRN: [id number]           Patient Information     Date Of Birth          1955        Visit Information        Provider Department      5/31/2018 3:00 PM Varsha Tavarez MD [facility]        Today's Diagnoses     Essential hypertension, benign    -  1    Osteoporosis without current pathological fracture, unspecified osteoporosis type        Edema, unspecified type        Hyperlipidemia LDL goal <130        Tachycardia        Special screening for malignant neoplasms, colon           Follow-ups after your visit        Your next 10 appointments already scheduled     Jun 06, 2018  9:30 AM CDT   LAB with [facility]   [facility] ([facility]    [address]  [facility] [address]   [phone]           Please do not eat 10-12 hours before your appointment if you are coming in fasting for labs on lipids, cholesterol, or glucose (sugar). This does not apply to pregnant women. Water, hot tea and black coffee (with nothing added) are okay. Do not drink other fluids, diet soda or chew gum.              Future tests that were ordered for you today     Open Future Orders        Priority Expected Expires Ordered    Basic metabolic panel Routine 6/1/2018 7/31/2018 5/31/2018    Lipid panel reflex to direct LDL Fasting Routine 6/1/2018 7/31/2018 5/31/2018    Albumin Random Urine Quantitative with Creat Ratio Routine 6/1/2018 7/31/2018 5/31/2018    Fecal colorectal cancer screen (FIT) Routine 6/21/2018 8/23/2018 5/31/2018            Who to contact     If you have questions or need follow up information about today's clinic visit or your schedule please contact [facility] directly at [phone].  Normal or non-critical lab and imaging results will be communicated to you by MyChart, letter or phone within 4 business days after the clinic has received the results. If you do not hear from  "us within 7 days, please contact the clinic through [employer] or phone. If you have a critical or abnormal lab result, we will notify you by phone as soon as possible.  Submit refill requests through [employer] or call your pharmacy and they will forward the refill request to us. Please allow 3 business days for your refill to be completed.          Additional Information About Your Visit        [employer] Information     [employer] gives you secure access to your electronic health record. If you see a primary care provider, you can also send messages to your care team and make appointments. If you have questions, please call your primary care clinic.  If you do not have a primary care provider, please call [phone] and they will assist you.        Care EveryWhere ID     This is your Care EveryWhere ID. This could be used by other organizations to access your [place] medical records  [id number]        Your Vitals Were     Pulse Temperature Respirations Height Pulse Oximetry BMI (Body Mass Index)    80 97.9  F (36.6  C) (Oral) 16 5' 2.85\" (1.596 m) 96% 24.4 kg/m2       Blood Pressure from Last 3 Encounters:   05/31/18 139/88   01/05/18 142/82   03/21/17 138/78    Weight from Last 3 Encounters:   05/31/18 137 lb 1.6 oz (62.2 kg)   01/05/18 132 lb 9.6 oz (60.1 kg)   03/21/17 136 lb 6.4 oz (61.9 kg)                 Today's Medication Changes          These changes are accurate as of 5/31/18  9:52 PM.  If you have any questions, ask your nurse or doctor.               These medicines have changed or have updated prescriptions.        Dose/Directions    metoprolol succinate 50 MG 24 hr tablet   Commonly known as:  TOPROL-XL   This may have changed:  See the new instructions.   Used for:  Essential hypertension, benign        TAKE 1 TABLET BY MOUTH TWICE DAILY   Quantity:  180 tablet   Refills:  0       simvastatin 40 MG tablet   Commonly known as:  ZOCOR   This may have changed:  Another medication with the same name was " removed. Continue taking this medication, and follow the directions you see here.   Used for:  Hyperlipidemia LDL goal <130   Changed by:  Varsha Tavarez MD        TAKE 1 TABLET(40 MG) BY MOUTH AT BEDTIME   Quantity:  90 tablet   Refills:  0       VITAMIN D-1000 MAX ST PO   This may have changed:  Another medication with the same name was removed. Continue taking this medication, and follow the directions you see here.   Used for:  History of mammography, screening, Osteoporosis, unspecified, Screening for malignant neoplasm of the rectum, Routine general medical examination at a health care facility, Need for prophylactic vaccination and inoculation against influenza, Essential hypertension, benign, Esophageal reflux, Hyperlipidemia LDL goal <130, Allergic rhinitis due to other allergen   Changed by:  Varsha Tavarez MD        Dose:  1 tablet   Take 1 tablet by mouth 2 times daily.   Refills:  0                Primary Care Provider Office Phone # Fax #    Varsha Tavarez [phone]       [address] [address]        Equal Access to Services     [facility]: Hadii david ku hadasho Soomaali, waaxda luqadaha, qaybta kaalmada adeegyada, waxay idiin hayaan nuzhat ayala . So [facility] [place] [phone].    ATENCIÓN: Si habla español, tiene a reyes disposición servicios gratuitos de asistencia lingüística. Llame al [phone].    We comply with applicable federal civil rights laws and [place] laws. We do not discriminate on the basis of race, color, national origin, age, disability, sex, sexual orientation, or gender identity.            Thank you!     Thank you for choosing [facility]  for your care. Our goal is always to provide you with excellent care. Hearing back from our patients is one way we can continue to improve our services. Please take a few minutes to complete the written survey that you may receive in the mail after your visit with us. Thank you!              Your Updated Medication List - Protect others around you: Learn how to safely use, store and throw away your medicines at www.disposemymeds.org.          This list is accurate as of 5/31/18  9:52 PM.  Always use your most recent med list.                   Brand Name Dispense Instructions for use Diagnosis    aspirin 81 MG tablet     100    ONE DAILY    Other and unspecified hyperlipidemia       CALCIUM 600 + D PO      Take 1 tablet by mouth 2 times daily.    History of mammography, screening, Osteoporosis, unspecified, Screening for malignant neoplasm of the rectum, Routine general medical examination at a health care facility, Need for prophylactic vaccination and inoculation against influenza, Essential hypertension, benign, Esophageal reflux, Hyperlipidemia LDL goal <130, Allergic rhinitis due to other allergen       losartan 100 MG tablet    COZAAR    90 tablet    TAKE 1 TABLET(100 MG) BY MOUTH DAILY    Essential hypertension, benign       metoprolol succinate 50 MG 24 hr tablet    TOPROL-XL    180 tablet    TAKE 1 TABLET BY MOUTH TWICE DAILY    Essential hypertension, benign       multivitamin per tablet      1 tablet daily    Osteoporosis, unspecified       PROBIOTIC DAILY PO      Take 1 tablet by mouth daily    H/O screening mammography, Routine general medical examination at a health care facility, Need for prophylactic vaccination and inoculation against influenza, Screening for malignant neoplasm of the rectum, Multiple nevi       simvastatin 40 MG tablet    ZOCOR    90 tablet    TAKE 1 TABLET(40 MG) BY MOUTH AT BEDTIME    Hyperlipidemia LDL goal <130       VITAMIN D-1000 MAX ST PO      Take 1 tablet by mouth 2 times daily.    History of mammography, screening, Osteoporosis, unspecified, Screening for malignant neoplasm of the rectum, Routine general medical examination at a health care facility, Need for prophylactic vaccination and inoculation against influenza, Essential hypertension, benign,  Esophageal reflux, Hyperlipidemia LDL goal <130, Allergic rhinitis due to other allergen       ZYRTEC ALLERGY 10 MG Caps   Generic drug:  cetirizine HCl      Take 1 capsule by mouth daily    H/O screening mammography, Routine general medical examination at a health care facility, Need for prophylactic vaccination and inoculation against influenza, Screening for malignant neoplasm of the rectum, Multiple nevi

## 2018-06-03 DIAGNOSIS — Z12.11 SPECIAL SCREENING FOR MALIGNANT NEOPLASMS, COLON: ICD-10-CM

## 2018-06-05 LAB — HEMOCCULT STL QL IA: NEGATIVE

## 2018-06-06 DIAGNOSIS — E78.5 HYPERLIPIDEMIA LDL GOAL <130: ICD-10-CM

## 2018-06-06 DIAGNOSIS — I10 ESSENTIAL HYPERTENSION, BENIGN: ICD-10-CM

## 2018-06-06 PROCEDURE — 80048 BASIC METABOLIC PNL TOTAL CA: CPT | Performed by: INTERNAL MEDICINE

## 2018-06-06 PROCEDURE — 36415 COLL VENOUS BLD VENIPUNCTURE: CPT | Performed by: INTERNAL MEDICINE

## 2018-06-06 PROCEDURE — 80061 LIPID PANEL: CPT | Performed by: INTERNAL MEDICINE

## 2018-06-06 PROCEDURE — 82043 UR ALBUMIN QUANTITATIVE: CPT | Performed by: INTERNAL MEDICINE

## 2018-06-07 LAB
ANION GAP SERPL CALCULATED.3IONS-SCNC: 9 MMOL/L (ref 3–14)
BUN SERPL-MCNC: 14 MG/DL (ref 7–30)
CALCIUM SERPL-MCNC: 9.7 MG/DL (ref 8.5–10.1)
CHLORIDE SERPL-SCNC: 105 MMOL/L (ref 94–109)
CHOLEST SERPL-MCNC: 202 MG/DL
CO2 SERPL-SCNC: 25 MMOL/L (ref 20–32)
CREAT SERPL-MCNC: 0.81 MG/DL (ref 0.52–1.04)
CREAT UR-MCNC: 36 MG/DL
GFR SERPL CREATININE-BSD FRML MDRD: 71 ML/MIN/1.7M2
GLUCOSE SERPL-MCNC: 95 MG/DL (ref 70–99)
HDLC SERPL-MCNC: 65 MG/DL
LDLC SERPL CALC-MCNC: 114 MG/DL
MICROALBUMIN UR-MCNC: 8 MG/L
MICROALBUMIN/CREAT UR: 20.77 MG/G CR (ref 0–25)
NONHDLC SERPL-MCNC: 137 MG/DL
POTASSIUM SERPL-SCNC: 4.7 MMOL/L (ref 3.4–5.3)
SODIUM SERPL-SCNC: 139 MMOL/L (ref 133–144)
TRIGL SERPL-MCNC: 113 MG/DL

## 2018-06-21 ENCOUNTER — MYC MEDICAL ADVICE (OUTPATIENT)
Dept: INTERNAL MEDICINE | Facility: CLINIC | Age: 63
End: 2018-06-21

## 2018-06-21 DIAGNOSIS — I10 ESSENTIAL HYPERTENSION, BENIGN: ICD-10-CM

## 2018-06-21 RX ORDER — METOPROLOL SUCCINATE 50 MG/1
TABLET, EXTENDED RELEASE ORAL
Qty: 180 TABLET | Refills: 1 | Status: SHIPPED | OUTPATIENT
Start: 2018-06-21 | End: 2018-12-20

## 2018-06-21 NOTE — TELEPHONE ENCOUNTER
"Requested Prescriptions   Pending Prescriptions Disp Refills     metoprolol succinate (TOPROL-XL) 50 MG 24 hr tablet [Pharmacy Med Name: METOPROLOL ER SUCCINATE 50MG TABS] 180 tablet 0     Sig: TAKE 1 TABLET BY MOUTH TWICE DAILY    Beta-Blockers Protocol Passed    6/21/2018  9:07 AM       Passed - Blood pressure under 140/90 in past 12 months    BP Readings from Last 3 Encounters:   05/31/18 139/88   01/05/18 142/82   03/21/17 138/78                Passed - Patient is age 6 or older       Passed - Recent (12 mo) or future (30 days) visit within the authorizing provider's specialty    Patient had office visit in the last 12 months or has a visit in the next 30 days with authorizing provider or within the authorizing provider's specialty.  See \"Patient Info\" tab in inbasket, or \"Choose Columns\" in Meds & Orders section of the refill encounter.            Prescription approved per [facility] [place] [facility]. HECTOR Nixon R.N.        "

## 2018-06-22 ENCOUNTER — MYC MEDICAL ADVICE (OUTPATIENT)
Dept: INTERNAL MEDICINE | Facility: CLINIC | Age: 63
End: 2018-06-22

## 2018-06-22 NOTE — TELEPHONE ENCOUNTER
See my chart message-      Called pt-relayed unfortunately there is not way to tell the exact cost, but when the order is entered, the infusion will not even be scheduled until their billing office looks at it. Relayed I will send request to [facility] to ok, and also to see if the 6/6 Creat & Calcium that was done is ok, or if it has to be redone.

## 2018-07-12 ENCOUNTER — TELEPHONE (OUTPATIENT)
Dept: INTERNAL MEDICINE | Facility: CLINIC | Age: 63
End: 2018-07-12

## 2018-07-12 NOTE — LETTER
[facility]  [address], [address]  [place], [place]  [address]                                            TEL:[phone]  FAX:[phone]      Beata Richey  [address] [facility] [address] [place] [address]          September 20, 2018    Dear Beata,  We sent you a letter a couple of weeks ago informing you of what you are due for, we hope that you did receive it. Your health is extremely important to us. Please take the time to consider calling the clinic to discuss your preventative health measures.?   Thank you for allowing us to help you take care of your health!       Sincerely,      Varsha Tavarez M.D.                  ?

## 2018-07-12 NOTE — TELEPHONE ENCOUNTER
Panel Management Review      Patient has the following on her problem list:       IVD   ASA: MONITOR    Last LDL:    Lab Results   Component Value Date    CHOL 202 06/06/2018     Lab Results   Component Value Date    HDL 65 06/06/2018     Lab Results   Component Value Date     06/06/2018     Lab Results   Component Value Date    TRIG 113 06/06/2018        Lab Results   Component Value Date    CHOLHDLRATIO 2.6 02/19/2015        Is the patient on a Statin? YES   Is the patient on Aspirin? YES                  Medications     HMG CoA Reductase Inhibitors    simvastatin (ZOCOR) 40 MG tablet    Salicylates    ASPIRIN 81 MG OR TABS          Last three blood pressure readings:  BP Readings from Last 3 Encounters:   05/31/18 139/88   01/05/18 142/82   03/21/17 138/78        Tobacco History:     History   Smoking Status     Never Smoker   Smokeless Tobacco     Never Used       Hypertension   Last three blood pressure readings:  BP Readings from Last 3 Encounters:   05/31/18 139/88   01/05/18 142/82   03/21/17 138/78     Blood pressure: Passed    HTN Guidelines:  Age 18-59 BP range:  Less than 140/90  Age 60-85 with Diabetes:  Less than 140/90  Age 60-85 without Diabetes:  less than 150/90      Composite cancer screening  Chart review shows that this patient is due/due soon for the following Fecal Colorectal (FIT)  Summary:    Patient is due/failing the following:   FIT    Action needed:   Schedule lab only appt to pickup fit kit from lab.    Type of outreach:    Sent letter.    Questions for provider review:    None                                                                                                                                     Debby Poe CMA       Chart routed to self .

## 2018-07-12 NOTE — LETTER
[facility]  [address], [address]  [place], [place]  [address]                                            TEL:[phone]  FAX:[phone]      Beata Richey  [address] [facility] [address] [place] [address]          July 12, 2018    Dear Beata,    Your health is important to us. Please complete your FIT test and follow the instruction provided for proper deliverance of the specimen. If you have not  a FIT kit from the lab department please call to make a lab only appointment to .  If you have any questions please call the clinic at [phone].          Sincerely,      Varsha Tavarez M.D.

## 2018-07-27 DIAGNOSIS — E78.5 HYPERLIPIDEMIA LDL GOAL <130: ICD-10-CM

## 2018-07-27 NOTE — TELEPHONE ENCOUNTER
"Requested Prescriptions   Pending Prescriptions Disp Refills     simvastatin (ZOCOR) 40 MG tablet [Pharmacy Med Name: SIMVASTATIN 40MG TABLETS] 90 tablet 0    Last Written Prescription Date:  04/13/2018  Last Fill Quantity: 90,  # refills: 0   Last office visit: 5/31/2018 with prescribing provider:     Future Office Visit:   Sig: TAKE 1 TABLET(40 MG) BY MOUTH AT BEDTIME    Statins Protocol Passed    7/27/2018 11:18 AM       Passed - LDL on file in past 12 months    Recent Labs   Lab Test  06/06/18   0925   LDL  114*            Passed - No abnormal creatine kinase in past 12 months    No lab results found.            Passed - Recent (12 mo) or future (30 days) visit within the authorizing provider's specialty    Patient had office visit in the last 12 months or has a visit in the next 30 days with authorizing provider or within the authorizing provider's specialty.  See \"Patient Info\" tab in inbasket, or \"Choose Columns\" in Meds & Orders section of the refill encounter.           Passed - Patient is age 18 or older       Passed - No active pregnancy on record       Passed - No positive pregnancy test in past 12 months        "

## 2018-07-31 DIAGNOSIS — I10 ESSENTIAL HYPERTENSION, BENIGN: ICD-10-CM

## 2018-07-31 NOTE — TELEPHONE ENCOUNTER
"Requested Prescriptions   Pending Prescriptions Disp Refills     losartan (COZAAR) 100 MG tablet [Pharmacy Med Name: LOSARTAN 100MG TABLETS]  Last Written Prescription Date:  5/7/18  Last Fill Quantity: 90,  # refills: 0   Last office visit: 5/31/2018 with prescribing provider:  5/31/18   Future Office Visit:     90 tablet 0     Sig: TAKE 1 TABLET(100 MG) BY MOUTH DAILY    Angiotensin-II Receptors Passed    7/31/2018 12:52 PM       Passed - Blood pressure under 140/90 in past 12 months    BP Readings from Last 3 Encounters:   05/31/18 139/88   01/05/18 142/82   03/21/17 138/78                Passed - Recent (12 mo) or future (30 days) visit within the authorizing provider's specialty    Patient had office visit in the last 12 months or has a visit in the next 30 days with authorizing provider or within the authorizing provider's specialty.  See \"Patient Info\" tab in inbasket, or \"Choose Columns\" in Meds & Orders section of the refill encounter.           Passed - Patient is age 18 or older       Passed - No active pregnancy on record       Passed - Normal serum creatinine on file in past 12 months    Recent Labs   Lab Test  06/06/18   0925   CR  0.81            Passed - Normal serum potassium on file in past 12 months    Recent Labs   Lab Test  06/06/18   0925   POTASSIUM  4.7                   Passed - No positive pregnancy test in past 12 months          "

## 2018-08-02 RX ORDER — SIMVASTATIN 40 MG
TABLET ORAL
Qty: 90 TABLET | Refills: 1 | Status: SHIPPED | OUTPATIENT
Start: 2018-08-02 | End: 2019-01-30

## 2018-08-02 RX ORDER — LOSARTAN POTASSIUM 100 MG/1
TABLET ORAL
Qty: 90 TABLET | Refills: 1 | Status: SHIPPED | OUTPATIENT
Start: 2018-08-02 | End: 2019-01-30

## 2018-12-20 DIAGNOSIS — I10 ESSENTIAL HYPERTENSION, BENIGN: ICD-10-CM

## 2018-12-20 RX ORDER — METOPROLOL SUCCINATE 50 MG/1
TABLET, EXTENDED RELEASE ORAL
Qty: 180 TABLET | Refills: 1 | Status: SHIPPED | OUTPATIENT
Start: 2018-12-20 | End: 2019-06-15

## 2018-12-20 NOTE — TELEPHONE ENCOUNTER
"Requested Prescriptions   Pending Prescriptions Disp Refills     metoprolol succinate ER (TOPROL-XL) 50 MG 24 hr tablet [Pharmacy Med Name: METOPROLOL ER SUCCINATE 50MG TABS]  Last Written Prescription Date: 06/21/2018  Last Fill Quantity: 180 tablet, # Refills: 1  Last Office Visit: 05/31/2018 [facility]  Future Office visit:      180 tablet 0     Sig: TAKE 1 TABLET BY MOUTH TWICE DAILY    Beta-Blockers Protocol Passed - 12/20/2018  9:40 AM       Passed - Blood pressure under 140/90 in past 12 months    BP Readings from Last 3 Encounters:   05/31/18 139/88   01/05/18 142/82   03/21/17 138/78                Passed - Patient is age 6 or older       Passed - Recent (12 mo) or future (30 days) visit within the authorizing provider's specialty    Patient had office visit in the last 12 months or has a visit in the next 30 days with authorizing provider or within the authorizing provider's specialty.  See \"Patient Info\" tab in inbasket, or \"Choose Columns\" in Meds & Orders section of the refill encounter.                "

## 2019-01-30 DIAGNOSIS — I10 ESSENTIAL HYPERTENSION, BENIGN: ICD-10-CM

## 2019-01-30 DIAGNOSIS — E78.5 HYPERLIPIDEMIA LDL GOAL <130: ICD-10-CM

## 2019-01-30 NOTE — TELEPHONE ENCOUNTER
"Requested Prescriptions   Pending Prescriptions Disp Refills     losartan (COZAAR) 100 MG tablet [Pharmacy Med Name: LOSARTAN 100MG TABLETS] 90 tablet 0    Last Written Prescription Date:  08/02/2018  Last Fill Quantity: 90,  # refills: 1   Last office visit: 5/31/2018 with prescribing provider:     Future Office Visit:   Sig: TAKE 1 TABLET(100 MG) BY MOUTH DAILY    Angiotensin-II Receptors Passed - 1/30/2019  2:06 PM       Passed - Blood pressure under 140/90 in past 12 months    BP Readings from Last 3 Encounters:   05/31/18 139/88   01/05/18 142/82   03/21/17 138/78                Passed - Recent (12 mo) or future (30 days) visit within the authorizing provider's specialty    Patient had office visit in the last 12 months or has a visit in the next 30 days with authorizing provider or within the authorizing provider's specialty.  See \"Patient Info\" tab in inbasket, or \"Choose Columns\" in Meds & Orders section of the refill encounter.             Passed - Medication is active on med list       Passed - Patient is age 18 or older       Passed - No active pregnancy on record       Passed - Normal serum creatinine on file in past 12 months    Recent Labs   Lab Test 06/06/18  0925   CR 0.81            Passed - Normal serum potassium on file in past 12 months    Recent Labs   Lab Test 06/06/18  0925   POTASSIUM 4.7                   Passed - No positive pregnancy test in past 12 months        simvastatin (ZOCOR) 40 MG tablet [Pharmacy Med Name: SIMVASTATIN 40MG TABLETS] 90 tablet 0    Last Written Prescription Date:  08/02/2018  Last Fill Quantity: 90,  # refills: 1   Last office visit: 5/31/2018 with prescribing provider:     Future Office Visit:   Sig: TAKE 1 TABLET(40 MG) BY MOUTH AT BEDTIME    Statins Protocol Passed - 1/30/2019  2:06 PM       Passed - LDL on file in past 12 months    Recent Labs   Lab Test 06/06/18  0925   *            Passed - No abnormal creatine kinase in past 12 months    No lab results " "found.            Passed - Recent (12 mo) or future (30 days) visit within the authorizing provider's specialty    Patient had office visit in the last 12 months or has a visit in the next 30 days with authorizing provider or within the authorizing provider's specialty.  See \"Patient Info\" tab in inbasket, or \"Choose Columns\" in Meds & Orders section of the refill encounter.             Passed - Medication is active on med list       Passed - Patient is age 18 or older       Passed - No active pregnancy on record       Passed - No positive pregnancy test in past 12 months        "

## 2019-02-01 RX ORDER — LOSARTAN POTASSIUM 100 MG/1
TABLET ORAL
Qty: 90 TABLET | Refills: 0 | Status: SHIPPED | OUTPATIENT
Start: 2019-02-01 | End: 2019-05-01

## 2019-02-01 RX ORDER — SIMVASTATIN 40 MG
TABLET ORAL
Qty: 90 TABLET | Refills: 0 | Status: SHIPPED | OUTPATIENT
Start: 2019-02-01 | End: 2019-05-01

## 2019-02-01 NOTE — TELEPHONE ENCOUNTER
Prescription approved per [facility] Refill Protocol.    Latosha Lemus RN   [facility]

## 2019-02-21 ENCOUNTER — MYC MEDICAL ADVICE (OUTPATIENT)
Dept: INTERNAL MEDICINE | Facility: CLINIC | Age: 64
End: 2019-02-21

## 2019-02-21 ENCOUNTER — TELEPHONE (OUTPATIENT)
Dept: INTERNAL MEDICINE | Facility: CLINIC | Age: 64
End: 2019-02-21

## 2019-02-21 DIAGNOSIS — Z12.31 VISIT FOR SCREENING MAMMOGRAM: Primary | ICD-10-CM

## 2019-05-01 ENCOUNTER — MYC MEDICAL ADVICE (OUTPATIENT)
Dept: INTERNAL MEDICINE | Facility: CLINIC | Age: 64
End: 2019-05-01

## 2019-05-01 DIAGNOSIS — I10 ESSENTIAL HYPERTENSION, BENIGN: ICD-10-CM

## 2019-05-01 DIAGNOSIS — E78.5 HYPERLIPIDEMIA LDL GOAL <130: ICD-10-CM

## 2019-05-01 RX ORDER — LOSARTAN POTASSIUM 100 MG/1
TABLET ORAL
Qty: 90 TABLET | Refills: 0 | Status: SHIPPED | OUTPATIENT
Start: 2019-05-01 | End: 2019-06-14

## 2019-05-01 RX ORDER — SIMVASTATIN 40 MG
TABLET ORAL
Qty: 90 TABLET | Refills: 0 | Status: SHIPPED | OUTPATIENT
Start: 2019-05-01 | End: 2019-06-14

## 2019-05-01 NOTE — LETTER
[facility]  [address], [address]  [place], [place]  [address]                                            TEL:[phone]  FAX:[phone]      Beata Richey  [address] [facility] [address] [place] [address]      May 15, 2019    Dear Beata    We are concerned about your health care and many medications require routine follow-up with your Doctor. We have received a refill request from your pharmacy, however, we were only able to provide a one time refill because you are due for your annual appointment and labs in June.     Please call [phone] to schedule an appointment before you are due for your next refill.      Thank you,     ANDREW Laboy

## 2019-05-01 NOTE — TELEPHONE ENCOUNTER
"Requested Prescriptions   Pending Prescriptions Disp Refills     losartan (COZAAR) 100 MG tablet [Pharmacy Med Name: LOSARTAN 100MG TABLETS] 90 tablet 0     Sig: TAKE 1 TABLET(100 MG) BY MOUTH DAILY   Last Written Prescription Date:  02/01/2019  Last Fill Quantity: 90,  # refills: 0   Last office visit: 5/31/2018 with prescribing provider:     Future Office Visit:      Angiotensin-II Receptors Passed - 5/1/2019  9:14 AM        Passed - Blood pressure under 140/90 in past 12 months     BP Readings from Last 3 Encounters:   05/31/18 139/88   01/05/18 142/82   03/21/17 138/78                 Passed - Recent (12 mo) or future (30 days) visit within the authorizing provider's specialty     Patient had office visit in the last 12 months or has a visit in the next 30 days with authorizing provider or within the authorizing provider's specialty.  See \"Patient Info\" tab in inbasket, or \"Choose Columns\" in Meds & Orders section of the refill encounter.              Passed - Medication is active on med list        Passed - Patient is age 18 or older        Passed - No active pregnancy on record        Passed - Normal serum creatinine on file in past 12 months     Recent Labs   Lab Test 06/06/18  0925   CR 0.81             Passed - Normal serum potassium on file in past 12 months     Recent Labs   Lab Test 06/06/18  0925   POTASSIUM 4.7                    Passed - No positive pregnancy test in past 12 months        simvastatin (ZOCOR) 40 MG tablet [Pharmacy Med Name: SIMVASTATIN 40MG TABLETS] 90 tablet 0     Sig: TAKE 1 TABLET(40 MG) BY MOUTH AT BEDTIME   Last Written Prescription Date:  02/01/2019  Last Fill Quantity: 90,  # refills: 0   Last office visit: 5/31/2018 with prescribing provider:     Future Office Visit:      Statins Protocol Passed - 5/1/2019  9:14 AM        Passed - LDL on file in past 12 months     Recent Labs   Lab Test 06/06/18  0925   *             Passed - No abnormal creatine kinase in past 12 " "months     No lab results found.             Passed - Recent (12 mo) or future (30 days) visit within the authorizing provider's specialty     Patient had office visit in the last 12 months or has a visit in the next 30 days with authorizing provider or within the authorizing provider's specialty.  See \"Patient Info\" tab in inbasket, or \"Choose Columns\" in Meds & Orders section of the refill encounter.              Passed - Medication is active on med list        Passed - Patient is age 18 or older        Passed - No active pregnancy on record        Passed - No positive pregnancy test in past 12 months        "

## 2019-05-01 NOTE — TELEPHONE ENCOUNTER
Medication is being filled for 1 time refill only due to:  Patient needs to be seen because due for annual appointment and labs in June.     [employer] message sent to patient advising her she will be due for appointment and labs in June.

## 2019-05-15 NOTE — TELEPHONE ENCOUNTER
Patient has not read [employer] message advising her to schedule appointment in June. Letter mailed to patient.

## 2019-06-03 ENCOUNTER — HOSPITAL ENCOUNTER (OUTPATIENT)
Dept: MAMMOGRAPHY | Facility: CLINIC | Age: 64
Discharge: HOME | End: 2019-06-03
Attending: INTERNAL MEDICINE | Admitting: INTERNAL MEDICINE
Payer: COMMERCIAL

## 2019-06-03 ENCOUNTER — TELEPHONE (OUTPATIENT)
Dept: INTERNAL MEDICINE | Facility: CLINIC | Age: 64
End: 2019-06-03

## 2019-06-03 DIAGNOSIS — Z12.31 VISIT FOR SCREENING MAMMOGRAM: ICD-10-CM

## 2019-06-03 PROCEDURE — 77067 SCR MAMMO BI INCL CAD: CPT

## 2019-06-03 NOTE — TELEPHONE ENCOUNTER
She did not follow-up with me after her last bone density when I suggested she restart medication.  If she is fairly adamantly opposed to being treated, I would see no reason to repeat a study.  It is unlikely it would have changed enough to change my recommendation that she should restart Boniva and it would be more helpful to recheck it 2 years after restarting it.

## 2019-06-03 NOTE — TELEPHONE ENCOUNTER
Patient schedule appointment for Dexa and does not have orders.  Please order prior to exam on 07/10/19.

## 2019-06-04 NOTE — TELEPHONE ENCOUNTER
Call to pt and advised her of Dr Tavarez's note.     She states the Boniva and oral pills gave her Reflux.     She had discussed with Dr Tavarez about starting Reclast.     She will discuss starting this when she sees Dr Tavarez on 6/14.     She agrees with cancelling the Dexa scan in July.     FYI.

## 2019-06-14 ENCOUNTER — OFFICE VISIT (OUTPATIENT)
Dept: INTERNAL MEDICINE | Facility: CLINIC | Age: 64
End: 2019-06-14
Payer: COMMERCIAL

## 2019-06-14 VITALS
BODY MASS INDEX: 23.94 KG/M2 | SYSTOLIC BLOOD PRESSURE: 140 MMHG | HEART RATE: 70 BPM | RESPIRATION RATE: 20 BRPM | WEIGHT: 135.1 LBS | OXYGEN SATURATION: 99 % | TEMPERATURE: 98.1 F | DIASTOLIC BLOOD PRESSURE: 70 MMHG | HEIGHT: 63 IN

## 2019-06-14 DIAGNOSIS — Z12.11 SPECIAL SCREENING FOR MALIGNANT NEOPLASMS, COLON: ICD-10-CM

## 2019-06-14 DIAGNOSIS — Z23 NEED FOR TD VACCINE: ICD-10-CM

## 2019-06-14 DIAGNOSIS — M81.0 OSTEOPOROSIS WITHOUT CURRENT PATHOLOGICAL FRACTURE, UNSPECIFIED OSTEOPOROSIS TYPE: ICD-10-CM

## 2019-06-14 DIAGNOSIS — I10 ESSENTIAL HYPERTENSION, BENIGN: Primary | ICD-10-CM

## 2019-06-14 DIAGNOSIS — E78.5 HYPERLIPIDEMIA LDL GOAL <130: ICD-10-CM

## 2019-06-14 LAB
ANION GAP SERPL CALCULATED.3IONS-SCNC: 8 MMOL/L (ref 3–14)
BUN SERPL-MCNC: 13 MG/DL (ref 7–30)
CALCIUM SERPL-MCNC: 9.3 MG/DL (ref 8.5–10.1)
CHLORIDE SERPL-SCNC: 110 MMOL/L (ref 94–109)
CHOLEST SERPL-MCNC: 181 MG/DL
CO2 SERPL-SCNC: 25 MMOL/L (ref 20–32)
CREAT SERPL-MCNC: 0.82 MG/DL (ref 0.52–1.04)
GFR SERPL CREATININE-BSD FRML MDRD: 74 ML/MIN/{1.73_M2}
GLUCOSE SERPL-MCNC: 90 MG/DL (ref 70–99)
HDLC SERPL-MCNC: 68 MG/DL
LDLC SERPL CALC-MCNC: 96 MG/DL
NONHDLC SERPL-MCNC: 113 MG/DL
POTASSIUM SERPL-SCNC: 4.4 MMOL/L (ref 3.4–5.3)
SODIUM SERPL-SCNC: 143 MMOL/L (ref 133–144)
TRIGL SERPL-MCNC: 87 MG/DL

## 2019-06-14 PROCEDURE — 82043 UR ALBUMIN QUANTITATIVE: CPT | Performed by: INTERNAL MEDICINE

## 2019-06-14 PROCEDURE — 99214 OFFICE O/P EST MOD 30 MIN: CPT | Mod: 25 | Performed by: INTERNAL MEDICINE

## 2019-06-14 PROCEDURE — 80048 BASIC METABOLIC PNL TOTAL CA: CPT | Performed by: INTERNAL MEDICINE

## 2019-06-14 PROCEDURE — 36415 COLL VENOUS BLD VENIPUNCTURE: CPT | Performed by: INTERNAL MEDICINE

## 2019-06-14 PROCEDURE — 80061 LIPID PANEL: CPT | Performed by: INTERNAL MEDICINE

## 2019-06-14 PROCEDURE — 90471 IMMUNIZATION ADMIN: CPT | Performed by: INTERNAL MEDICINE

## 2019-06-14 PROCEDURE — 90714 TD VACC NO PRESV 7 YRS+ IM: CPT | Performed by: INTERNAL MEDICINE

## 2019-06-14 ASSESSMENT — MIFFLIN-ST. JEOR: SCORE: 1117.97

## 2019-06-14 NOTE — PROGRESS NOTES
Subjective     Beata Richey is a 64 year old female who presents to clinic today for the following health issues:    HPI   Hyperlipidemia Follow-Up      Are you having any of the following symptoms? (Select all that apply)  No complaints of shortness of breath, chest pain or pressure.  No increased sweating or nausea with activity.  No left-sided neck or arm pain.  No complaints of pain in calves when walking 1-2 blocks.    Are you regularly taking any medication or supplement to lower your cholesterol?   Yes- see chart    Are you having muscle aches or other side effects that you think could be caused by your cholesterol lowering medication?  No      Hypertension Follow-up      Do you check your blood pressure regularly outside of the clinic? No     Are you following a low salt diet? No    Are your blood pressures ever more than 140 on the top number (systolic) OR more   than 90 on the bottom number (diastolic), for example 140/90? No    Amount of exercise or physical activity: 6-7 days/week for an average of 45-60 minutes    Problems taking medications regularly: No    Medication side effects: none    Diet: regular (no restrictions)      Other problems:   1.  Osteoporosis: We talked about doing the Reclast, she had not followed through but would like to get that scheduled now.  2. GERD: stable    Current Concerns:   none    Patient Active Problem List   Diagnosis     Essential hypertension, benign     Osteoporosis     Allergic rhinitis     Tachycardia     Esophageal reflux     Hyperlipidemia LDL goal <130     Advanced directives, counseling/discussion       Current Outpatient Medications   Medication Sig Dispense Refill     ASPIRIN 81 MG OR TABS ONE DAILY 100 3     Calcium Carbonate-Vitamin D (CALCIUM 600 + D OR) Take 1 tablet by mouth 2 times daily.       cetirizine HCl (ZYRTEC ALLERGY) 10 MG CAPS Take 1 capsule by mouth daily       Cholecalciferol (VITAMIN D-1000 MAX ST PO) Take 1 tablet by mouth 2 times daily.   "     losartan (COZAAR) 100 MG tablet TAKE 1 TABLET(100 MG) BY MOUTH DAILY 90 tablet 0     metoprolol succinate ER (TOPROL-XL) 50 MG 24 hr tablet TAKE 1 TABLET BY MOUTH TWICE DAILY 180 tablet 1     MULTIVITAMINS OR TABS 1 tablet daily  0     Probiotic Product (PROBIOTIC DAILY PO) Take 1 tablet by mouth daily       simvastatin (ZOCOR) 40 MG tablet TAKE 1 TABLET(40 MG) BY MOUTH AT BEDTIME 90 tablet 0         Social History     Tobacco Use     Smoking status: Never Smoker     Smokeless tobacco: Never Used   Substance Use Topics     Alcohol use: Yes     Comment: about 3 times a week a glass of wine        Review of Systems   No fever, chills, no dyspnea on exertion, no chest pain, palpitations, PND, orthopnea, edema, syncope, headache, abdominal pain       Objective    /70 (BP Location: Left arm, Patient Position: Sitting, Cuff Size: Adult Regular)   Pulse 70   Temp 98.1  F (36.7  C) (Oral)   Resp 20   Ht 1.594 m (5' 2.75\")   Wt 61.3 kg (135 lb 1.6 oz)   SpO2 99%   BMI 24.12 kg/m    Body mass index is 24.12 kg/m .  Physical Exam     Lungs: clear  CV: normal S1, S2 without murmur, S3 or S4.   Abdomen: Bowel sounds normal, soft, nontender. No hepatosplenomegaly. No masses.   No edema  Pulses full, no carotid bruits          Assessment & Plan     1. Essential hypertension, benign  Borderline control, recommend that she check occasionally, advised her goal is less than 130/80 so recommend she send me some readings in a few weeks, may need to adjust her metoprolol  - Basic metabolic panel  (Ca, Cl, CO2, Creat, Gluc, K, Na, BUN)  - Albumin Random Urine Quantitative with Creat Ratio  - losartan (COZAAR) 100 MG tablet; Take 1 tablet (100 mg) by mouth daily  Dispense: 90 tablet; Refill: 3    2. Hyperlipidemia LDL goal <130  Recheck lab, continue medication  - Lipid panel reflex to direct LDL Fasting  - simvastatin (ZOCOR) 40 MG tablet; Take 1 tablet (40 mg) by mouth At Bedtime  Dispense: 90 tablet; Refill: 3    3. " Osteoporosis without current pathological fracture, unspecified osteoporosis type  Once her labs are back, we will contact the infusion center about scheduling the request    4. Special screening for malignant neoplasms, colon  due  - Fecal colorectal cancer screen (FIT); Future    5. Need for Td vaccine  due  - C TD PRSERV FREE >=7 YRS ADS IM           Return in about 1 year (around 6/14/2020).    Varsha Tavarez MD  [facility]

## 2019-06-15 LAB
CREAT UR-MCNC: 29 MG/DL
MICROALBUMIN UR-MCNC: 7 MG/L
MICROALBUMIN/CREAT UR: 23.7 MG/G CR (ref 0–25)

## 2019-06-15 RX ORDER — METOPROLOL SUCCINATE 100 MG/1
100 TABLET, EXTENDED RELEASE ORAL DAILY
Qty: 90 TABLET | Refills: 3 | Status: SHIPPED | OUTPATIENT
Start: 2019-06-15

## 2019-06-15 RX ORDER — SIMVASTATIN 40 MG
40 TABLET ORAL AT BEDTIME
Qty: 90 TABLET | Refills: 3 | Status: SHIPPED | OUTPATIENT
Start: 2019-06-15 | End: 2020-08-13

## 2019-06-15 RX ORDER — LOSARTAN POTASSIUM 100 MG/1
100 TABLET ORAL DAILY
Qty: 90 TABLET | Refills: 3 | Status: SHIPPED | OUTPATIENT
Start: 2019-06-15 | End: 2020-08-13

## 2019-07-04 ENCOUNTER — MYC MEDICAL ADVICE (OUTPATIENT)
Dept: INTERNAL MEDICINE | Facility: CLINIC | Age: 64
End: 2019-07-04

## 2019-07-05 RX ORDER — ZOLEDRONIC ACID 5 MG/100ML
5 INJECTION, SOLUTION INTRAVENOUS ONCE
Status: CANCELLED
Start: 2019-08-05

## 2019-07-05 NOTE — TELEPHONE ENCOUNTER
See her [employer] message.   Pended the Reclast. Under admit/therapy. Changed the date for m+1. Can x out the labs, if doesn't need repeat.   She can call Infusion [facility]tr, once the orders are signed. The # is [phone]      6/14/19 OV,Per Dr Tavarez:   3. Osteoporosis without current pathological fracture, unspecified osteoporosis type  Once her labs are back, we will contact the infusion center about scheduling the request

## 2019-07-21 DIAGNOSIS — I10 ESSENTIAL HYPERTENSION, BENIGN: ICD-10-CM

## 2019-07-22 RX ORDER — METOPROLOL SUCCINATE 50 MG/1
TABLET, EXTENDED RELEASE ORAL
Qty: 180 TABLET | Refills: 0 | Status: SHIPPED | OUTPATIENT
Start: 2019-07-22

## 2019-07-22 NOTE — TELEPHONE ENCOUNTER
"Requested Prescriptions   Pending Prescriptions Disp Refills     metoprolol succinate ER (TOPROL-XL) 50 MG 24 hr tablet [Pharmacy Med Name: METOPROLOL ER SUCCINATE 50MG TABS] 180 tablet 0     Sig: TAKE 1 TABLET BY MOUTH TWICE DAILY       Beta-Blockers Protocol Failed - 7/21/2019  4:18 AM        Failed - Blood pressure under 140/90 in past 12 months     BP Readings from Last 3 Encounters:   06/14/19 140/70   05/31/18 139/88   01/05/18 142/82                 Passed - Patient is age 6 or older        Passed - Recent (12 mo) or future (30 days) visit within the authorizing provider's specialty     Patient had office visit in the last 12 months or has a visit in the next 30 days with authorizing provider or within the authorizing provider's specialty.  See \"Patient Info\" tab in inbasket, or \"Choose Columns\" in Meds & Orders section of the refill encounter.              Passed - Medication is active on med list      Prescription approved per [facility] Refill Protocol. HECTOR Nixon R.N.          "

## 2019-08-06 ENCOUNTER — INFUSION THERAPY VISIT (OUTPATIENT)
Dept: INFUSION THERAPY | Facility: CLINIC | Age: 64
End: 2019-08-06
Attending: INTERNAL MEDICINE
Payer: COMMERCIAL

## 2019-08-06 VITALS
OXYGEN SATURATION: 99 % | DIASTOLIC BLOOD PRESSURE: 70 MMHG | TEMPERATURE: 97.6 F | HEART RATE: 59 BPM | RESPIRATION RATE: 18 BRPM | SYSTOLIC BLOOD PRESSURE: 136 MMHG

## 2019-08-06 DIAGNOSIS — M81.0 OSTEOPOROSIS WITHOUT CURRENT PATHOLOGICAL FRACTURE, UNSPECIFIED OSTEOPOROSIS TYPE: Primary | ICD-10-CM

## 2019-08-06 PROCEDURE — 96365 THER/PROPH/DIAG IV INF INIT: CPT

## 2019-08-06 PROCEDURE — 25000128 H RX IP 250 OP 636: Performed by: INTERNAL MEDICINE

## 2019-08-06 RX ORDER — ZOLEDRONIC ACID 5 MG/100ML
5 INJECTION, SOLUTION INTRAVENOUS ONCE
Status: COMPLETED | OUTPATIENT
Start: 2019-08-06 | End: 2019-08-06

## 2019-08-06 RX ORDER — ZOLEDRONIC ACID 5 MG/100ML
5 INJECTION, SOLUTION INTRAVENOUS ONCE
Status: CANCELLED
Start: 2020-09-18

## 2019-08-06 RX ADMIN — ZOLEDRONIC ACID 5 MG: 0.05 INJECTION, SOLUTION INTRAVENOUS at 10:44

## 2019-08-06 NOTE — PROGRESS NOTES
Infusion Nursing Note:  Beata Richey presents today for Reclast.    Patient seen by provider today: No   present during visit today: Not Applicable.    Note: N/A.    Intravenous Access:  No Intravenous access/labs at this visit.    Treatment Conditions:  Not Applicable.      Post Infusion Assessment:  Patient tolerated infusion without incident.  Blood return noted pre and post infusion.  Site patent and intact, free from redness, edema or discomfort.  No evidence of extravasations.  Access discontinued per protocol.       Discharge Plan:   Patient declined prescription refills.  Discharge instructions reviewed with: Patient.  Patient and/or family verbalized understanding of discharge instructions and all questions answered.  Copy of AVS reviewed with patient and/or family.  Patient will return in one year for next appointment.  Patient discharged in stable condition accompanied by: .  Departure Mode: Ambulatory.    Corazon Lozano RN

## 2019-10-02 ENCOUNTER — HEALTH MAINTENANCE LETTER (OUTPATIENT)
Age: 64
End: 2019-10-02

## 2019-10-18 DIAGNOSIS — I10 ESSENTIAL HYPERTENSION, BENIGN: ICD-10-CM

## 2019-10-18 NOTE — TELEPHONE ENCOUNTER
"Requested Prescriptions   Pending Prescriptions Disp Refills     metoprolol succinate ER (TOPROL-XL) 50 MG 24 hr tablet [Pharmacy Med Name: METOPROLOL ER SUCCINATE 50MG TABS] 180 tablet 0     Sig: TAKE 1 TABLET BY MOUTH TWICE DAILY   Last Written Prescription Date:  07/22/2019  Last Fill Quantity: 180,  # refills: 0   Last office visit: 6/14/2019 with prescribing provider:     Future Office Visit:      Beta-Blockers Protocol Passed - 10/18/2019  4:05 AM        Passed - Blood pressure under 140/90 in past 12 months     BP Readings from Last 3 Encounters:   08/06/19 136/70   06/14/19 140/70   05/31/18 139/88                 Passed - Patient is age 6 or older        Passed - Recent (12 mo) or future (30 days) visit within the authorizing provider's specialty     Patient has had an office visit with the authorizing provider or a provider within the authorizing providers department within the previous 12 mos or has a future within next 30 days. See \"Patient Info\" tab in inbasket, or \"Choose Columns\" in Meds & Orders section of the refill encounter.              Passed - Medication is active on med list        "

## 2019-10-19 RX ORDER — METOPROLOL SUCCINATE 50 MG/1
TABLET, EXTENDED RELEASE ORAL
Qty: 180 TABLET | Refills: 0 | OUTPATIENT
Start: 2019-10-19

## 2019-10-19 NOTE — TELEPHONE ENCOUNTER
Duplicate.  Per chart, Metolprolol 100mg 1 tab daily was e-scribed to the pharmacy for #90 tabs with 3 refills.

## 2019-12-15 ENCOUNTER — HEALTH MAINTENANCE LETTER (OUTPATIENT)
Age: 64
End: 2019-12-15

## 2020-08-11 ENCOUNTER — MYC MEDICAL ADVICE (OUTPATIENT)
Dept: INTERNAL MEDICINE | Facility: CLINIC | Age: 65
End: 2020-08-11

## 2020-08-12 DIAGNOSIS — I10 ESSENTIAL HYPERTENSION, BENIGN: ICD-10-CM

## 2020-08-12 DIAGNOSIS — E78.5 HYPERLIPIDEMIA LDL GOAL <130: ICD-10-CM

## 2020-08-13 RX ORDER — LOSARTAN POTASSIUM 100 MG/1
TABLET ORAL
Qty: 90 TABLET | Refills: 0 | Status: SHIPPED | OUTPATIENT
Start: 2020-08-13

## 2020-08-13 RX ORDER — SIMVASTATIN 40 MG
TABLET ORAL
Qty: 90 TABLET | Refills: 0 | Status: SHIPPED | OUTPATIENT
Start: 2020-08-13

## 2020-08-13 NOTE — TELEPHONE ENCOUNTER
Patient called back, she has not established care yet because the medical records have not been received by her new provider from  [place] . Please send refills to [facility], [place].

## 2020-08-13 NOTE — TELEPHONE ENCOUNTER
Nausea will provide 1 more refill but after that I will not be able to do any more prescriptions.  She can check with her new clinic if they are on epic, they will be able to see her records through the computer with a release otherwise give her the number to call so she can check and make sure they are getting her records sent.

## 2020-08-13 NOTE — TELEPHONE ENCOUNTER
Due for office visit and labs, per 8/11/2020 Mychart encounter, patient has moved to [facility].     Attempted to contact patient. Left voice message to call back.     Please ask patient is she has established care with a new provider or if she needs refill to allow time to make appointment and be seen by new provider.

## 2020-11-18 DIAGNOSIS — E78.5 HYPERLIPIDEMIA LDL GOAL <130: ICD-10-CM

## 2020-11-18 DIAGNOSIS — I10 ESSENTIAL HYPERTENSION, BENIGN: ICD-10-CM

## 2020-11-19 NOTE — TELEPHONE ENCOUNTER
Please verify if pt has established care with a new provider.  She was given a 3 month ky refill in August

## 2020-12-03 ENCOUNTER — MYC MEDICAL ADVICE (OUTPATIENT)
Dept: INTERNAL MEDICINE | Facility: CLINIC | Age: 65
End: 2020-12-03

## 2020-12-08 RX ORDER — LOSARTAN POTASSIUM 100 MG/1
TABLET ORAL
Qty: 90 TABLET | Refills: 0 | OUTPATIENT
Start: 2020-12-08

## 2020-12-08 RX ORDER — SIMVASTATIN 40 MG
TABLET ORAL
Qty: 90 TABLET | Refills: 0 | OUTPATIENT
Start: 2020-12-08

## 2020-12-08 NOTE — TELEPHONE ENCOUNTER
Several attempts have been made to contact Patient - no response will close encounter .       SUNNI Garcia LPN

## 2021-01-15 ENCOUNTER — HEALTH MAINTENANCE LETTER (OUTPATIENT)
Age: 66
End: 2021-01-15

## 2021-03-21 ENCOUNTER — HEALTH MAINTENANCE LETTER (OUTPATIENT)
Age: 66
End: 2021-03-21

## 2021-09-04 ENCOUNTER — HEALTH MAINTENANCE LETTER (OUTPATIENT)
Age: 66
End: 2021-09-04

## 2022-02-19 ENCOUNTER — HEALTH MAINTENANCE LETTER (OUTPATIENT)
Age: 67
End: 2022-02-19

## 2022-04-16 ENCOUNTER — HEALTH MAINTENANCE LETTER (OUTPATIENT)
Age: 67
End: 2022-04-16

## 2022-10-22 ENCOUNTER — HEALTH MAINTENANCE LETTER (OUTPATIENT)
Age: 67
End: 2022-10-22

## 2023-04-01 ENCOUNTER — HEALTH MAINTENANCE LETTER (OUTPATIENT)
Age: 68
End: 2023-04-01

## 2023-06-01 ENCOUNTER — HEALTH MAINTENANCE LETTER (OUTPATIENT)
Age: 68
End: 2023-06-01